# Patient Record
Sex: MALE | Race: WHITE | NOT HISPANIC OR LATINO | Employment: OTHER | URBAN - METROPOLITAN AREA
[De-identification: names, ages, dates, MRNs, and addresses within clinical notes are randomized per-mention and may not be internally consistent; named-entity substitution may affect disease eponyms.]

---

## 2022-03-15 ENCOUNTER — TRANSFERRED RECORDS (OUTPATIENT)
Dept: HEALTH INFORMATION MANAGEMENT | Facility: CLINIC | Age: 82
End: 2022-03-15

## 2022-03-15 LAB
CHOLESTEROL (EXTERNAL): 98 MG/DL (ref 0–200)
HDLC SERPL-MCNC: 55 MG/DL (ref 35–65)
LDL CHOLESTEROL CALCULATED (EXTERNAL): 28 MG/DL (ref 0–130)
TRIGLYCERIDES (EXTERNAL): 72 MG/DL (ref 0–200)

## 2023-03-15 ENCOUNTER — TRANSFERRED RECORDS (OUTPATIENT)
Dept: HEALTH INFORMATION MANAGEMENT | Facility: CLINIC | Age: 83
End: 2023-03-15

## 2023-11-13 ENCOUNTER — MEDICAL CORRESPONDENCE (OUTPATIENT)
Dept: HEALTH INFORMATION MANAGEMENT | Facility: CLINIC | Age: 83
End: 2023-11-13
Payer: COMMERCIAL

## 2023-11-13 ENCOUNTER — TRANSFERRED RECORDS (OUTPATIENT)
Dept: HEALTH INFORMATION MANAGEMENT | Facility: CLINIC | Age: 83
End: 2023-11-13
Payer: COMMERCIAL

## 2023-11-20 ENCOUNTER — OFFICE VISIT (OUTPATIENT)
Dept: CARDIOLOGY | Facility: CLINIC | Age: 83
End: 2023-11-20
Payer: COMMERCIAL

## 2023-11-20 VITALS
SYSTOLIC BLOOD PRESSURE: 134 MMHG | DIASTOLIC BLOOD PRESSURE: 60 MMHG | WEIGHT: 199 LBS | HEART RATE: 60 BPM | HEIGHT: 68 IN | RESPIRATION RATE: 16 BRPM | BODY MASS INDEX: 30.16 KG/M2

## 2023-11-20 DIAGNOSIS — I77.89 ASCENDING AORTA ENLARGEMENT (H): ICD-10-CM

## 2023-11-20 DIAGNOSIS — E78.5 HYPERLIPIDEMIA LDL GOAL <70: ICD-10-CM

## 2023-11-20 DIAGNOSIS — I25.10 CORONARY ARTERY DISEASE INVOLVING NATIVE CORONARY ARTERY OF NATIVE HEART WITHOUT ANGINA PECTORIS: Primary | ICD-10-CM

## 2023-11-20 DIAGNOSIS — I10 BENIGN ESSENTIAL HYPERTENSION: ICD-10-CM

## 2023-11-20 PROCEDURE — 99204 OFFICE O/P NEW MOD 45 MIN: CPT | Performed by: INTERNAL MEDICINE

## 2023-11-20 RX ORDER — LOSARTAN POTASSIUM 100 MG/1
100 TABLET ORAL DAILY
COMMUNITY
Start: 2023-03-13 | End: 2024-06-04

## 2023-11-20 RX ORDER — ALBUTEROL SULFATE 90 UG/1
1-2 AEROSOL, METERED RESPIRATORY (INHALATION) PRN
COMMUNITY

## 2023-11-20 RX ORDER — ROSUVASTATIN CALCIUM 10 MG/1
10 TABLET, COATED ORAL DAILY
COMMUNITY
Start: 2023-03-13 | End: 2024-06-04

## 2023-11-20 RX ORDER — AMLODIPINE BESYLATE 5 MG/1
5 TABLET ORAL DAILY
COMMUNITY
Start: 2023-03-13 | End: 2024-06-04

## 2023-11-20 RX ORDER — SILDENAFIL 100 MG/1
100 TABLET, FILM COATED ORAL DAILY PRN
COMMUNITY
Start: 2023-09-27

## 2023-11-20 RX ORDER — PHENYLEPHRINE HCL 10 MG
1 TABLET ORAL 2 TIMES DAILY
COMMUNITY

## 2023-11-20 RX ORDER — TADALAFIL 5 MG/1
5 TABLET ORAL DAILY PRN
COMMUNITY
Start: 2023-09-11

## 2023-11-20 RX ORDER — ACETAMINOPHEN 500 MG
1000 TABLET ORAL PRN
COMMUNITY
Start: 2022-08-18

## 2023-11-20 RX ORDER — SODIUM PHOSPHATE,MONO-DIBASIC 19G-7G/118
1 ENEMA (ML) RECTAL 2 TIMES DAILY
COMMUNITY

## 2023-11-20 RX ORDER — METOPROLOL SUCCINATE 50 MG/1
50 TABLET, EXTENDED RELEASE ORAL DAILY
COMMUNITY
Start: 2023-03-13 | End: 2024-06-04

## 2023-11-20 RX ORDER — LATANOPROST 50 UG/ML
1 SOLUTION/ DROPS OPHTHALMIC DAILY
COMMUNITY

## 2023-11-20 RX ORDER — FLUTICASONE PROPIONATE AND SALMETEROL 250; 50 UG/1; UG/1
1 POWDER RESPIRATORY (INHALATION) DAILY
COMMUNITY

## 2023-11-20 RX ORDER — LACTOBACILLUS RHAMNOSUS GG 10B CELL
1 CAPSULE ORAL 2 TIMES DAILY
COMMUNITY

## 2023-11-20 RX ORDER — COVID-19 ANTIGEN TEST
220 KIT MISCELLANEOUS 2 TIMES DAILY
COMMUNITY

## 2023-11-20 RX ORDER — GABAPENTIN 600 MG/1
900 TABLET ORAL 2 TIMES DAILY
COMMUNITY
Start: 2023-01-17

## 2023-11-20 RX ORDER — ASPIRIN 81 MG/1
81 TABLET, CHEWABLE ORAL DAILY
COMMUNITY

## 2023-11-20 NOTE — PROGRESS NOTES
[facility]  [phone]          Assessment/Recommendations   With known coronary artery disease, status post bare-metal stent in the year 2000.  He is also been treated for hypertension and hyperlipidemia both successfully.  Recent LDL was 68 and blood pressure is at goal.  He also takes an antiplatelet agent.    He exercises 5-6 times a week at the [facility] doing 30 to 45 minutes on either the treadmill or the elliptical without any anginal symptoms.    He did have an enlarged ascending aorta 2 years ago and we will repeat an echocardiogram.  If his aorta looks bigger than 4.0 cm, I would have a low threshold for CT to look at his ascending aortic in its entirety.  We could do that here in [place].    If above testing is unremarkable, we will see him back in 1 year, but of course would be happy to see him sooner if questions or problems arise.    Thank you for allowing us to participate in his care.       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  Patient has been feeling well.  He is a  x1 year and initially did not do much in the way of exercise after his spouse passed.  He has been picking that up of late and works out at the [facility] 5-6 times a week, going on the treadmill and or elliptical for 30 to 45 minutes.  He has no chest discomfort and also denies unusual shortness of breath, orthopnea, paroxysmal nocturnal dyspnea, peripheral edema, syncope or near syncopal episodes.  He has no history rheumatic fever, heart murmur as a child but not as an adult.  No history of cerebrovascular accident.    He is treated for hypertension and his father had angina in his 50s and bypass surgery in his 80s.  Patient does have hypertension and hyperlipidemia but is not diabetic and quit smoking 30 years ago.    Patient grew up in [facility] [place] [facility].  He is a retired dermatology .  " He has 2 daughters and grandchildren which is why he moved up into the [facility] area.    Echocardiogram 2 years ago suggested enlargement of the ascending aorta at 4.0 cm.  Nuclear stress test was unremarkable a couple of years ago.         Physical Examination Review of Systems   /60 (BP Location: Left arm, Patient Position: Sitting, Cuff Size: Adult Regular)   Pulse 60   Resp 16   Ht 1.727 m (5' 8\")   Wt 90.3 kg (199 lb)   BMI 30.26 kg/m    Body mass index is 30.26 kg/m .  Wt Readings from Last 3 Encounters:   11/20/23 90.3 kg (199 lb)   02/07/14 93.7 kg (206 lb 8 oz)     General Appearance:   Alert, cooperative and in no acute distress.   ENT/Mouth: Pink/moist oral mucosa   EYES:  no scleral icterus, normal conjunctivae   Neck: JVP normal. No Hepatojugular reflux. Thyroid not visualized.   Chest/Lungs:   Lungs are clear to auscultation, equal chest wall expansion.   Cardiovascular:   S1, S2 without murmur ,clicks or rubs. Brachial, radial and posterior tibial pulses are intact and symetric.  Right posterior tibial pulses are slightly diminished when compared to the left.  No carotid bruits noted   Abdomen:  Nontender. BS+.   Extremities: No cyanosis, clubbing or edema   Skin: no xanthelasma, warm.    Neurologic: normal arm movement bilateral, no tremors     Psychiatric: Appropriate affect.      Enc Vitals  BP: 134/60  Pulse: 60  Resp: 16  Weight: 90.3 kg (199 lb)  Height: 172.7 cm (5' 8\")                                           Medical History  Surgical History Family History Social History   No past medical history on file. No past surgical history on file. No family history on file. Social History     Socioeconomic History    Marital status:      Spouse name: Not on file    Number of children: Not on file    Years of education: Not on file    Highest education level: Not on file   Occupational History    Not on file   Tobacco Use    Smoking status: Former     Types: " Cigarettes     Quit date:      Years since quittin.9    Smokeless tobacco: Never   Vaping Use    Vaping Use: Never used   Substance and Sexual Activity    Alcohol use: Not on file    Drug use: Never    Sexual activity: Not on file   Other Topics Concern    Not on file   Social History Narrative    Not on file     Social Determinants of Health     Financial Resource Strain: Not on file   Food Insecurity: Not on file   Transportation Needs: Not on file   Physical Activity: Not on file   Stress: Not on file   Social Connections: Not on file   Interpersonal Safety: Not on file   Housing Stability: Not on file          Medications  Allergies   Current Outpatient Medications   Medication Sig Dispense Refill    acetaminophen (TYLENOL) 500 MG tablet Take 1,000 mg by mouth as needed      albuterol (PROAIR HFA/PROVENTIL HFA/VENTOLIN HFA) 108 (90 Base) MCG/ACT inhaler Inhale 1-2 puffs into the lungs as needed      amLODIPine (NORVASC) 5 MG tablet Take 5 mg by mouth daily      ascorbic acid 1000 MG TABS tablet Take 1 tablet by mouth daily      aspirin (ASA) 81 MG chewable tablet Take 81 mg by mouth daily      Ferrous Sulfate (IRON PO) Take 1 tablet by mouth daily      fluticasone-salmeterol (ADVAIR) 250-50 MCG/ACT inhaler Inhale 1 puff into the lungs daily      gabapentin (NEURONTIN) 600 MG tablet Take 900 mg by mouth 2 times daily      glucosamine-chondroitin 500-400 MG CAPS per capsule Take 1 capsule by mouth 2 times daily      lactobacillus rhamnosus, GG, (CULTURELL) capsule Take 1 capsule by mouth 2 times daily      latanoprost (XALATAN) 0.005 % ophthalmic solution Place 1 drop into both eyes daily      losartan (COZAAR) 100 MG tablet Take 100 mg by mouth daily      metoprolol succinate ER (TOPROL XL) 50 MG 24 hr tablet Take 50 mg by mouth daily      naproxen sodium 220 MG capsule Take 220 mg by mouth 2 times daily      Omega-3 Fatty Acids (FISH OIL DOUBLE STRENGTH) 1200 MG CAPS Take 1 capsule by mouth 2 times  "daily      omeprazole (PRILOSEC) 20 MG DR capsule Take 20 mg by mouth daily      rosuvastatin (CRESTOR) 10 MG tablet Take 10 mg by mouth daily      sildenafil (VIAGRA) 100 MG tablet Take 100 mg by mouth daily as needed      tadalafil (CIALIS) 5 MG tablet Take 5 mg by mouth daily as needed      Allergies   Allergen Reactions    Azithromycin Hives    Rabies Vaccine (Hamster) Hives     Says human rabies immune globulin-no reactions noted    Sulfa Antibiotics Hives    Atorvastatin Muscle Pain (Myalgia)    Rabies Immune Globulin (Human) Angioedema, Hives and Other (See Comments)    Silicone Rash         Lab Results    Chemistry/lipid CBC Cardiac Enzymes/BNP/TSH/INR   No results found for: \"CHOL\", \"HDL\", \"TRIG\", \"CHOLHDL\", \"CREATININE\", \"BUN\", \"NA\", \"CO2\" No results found for: \"WBC\", \"HGB\", \"HCT\", \"MCV\", \"PLT\" No results found for: \"CKTOTAL\", \"CKMB\", \"TROPONINI\", \"BNP\", \"TSH\", \"INR\"                                         "

## 2023-11-20 NOTE — LETTER
11/20/2023    Sergei Zarate MD  [facility] [facility] [address]    RE: Avel Hernandez       Dear Colleague,     I had the pleasure of seeing Avel Hernandez in the [facility].      [facility]  [phone]          Assessment/Recommendations   With known coronary artery disease, status post bare-metal stent in the year 2000.  He is also been treated for hypertension and hyperlipidemia both successfully.  Recent LDL was 68 and blood pressure is at goal.  He also takes an antiplatelet agent.    He exercises 5-6 times a week at the [facility] doing 30 to 45 minutes on either the treadmill or the elliptical without any anginal symptoms.    He did have an enlarged ascending aorta 2 years ago and we will repeat an echocardiogram.  If his aorta looks bigger than 4.0 cm, I would have a low threshold for CT to look at his ascending aortic in its entirety.  We could do that here in [place].    If above testing is unremarkable, we will see him back in 1 year, but of course would be happy to see him sooner if questions or problems arise.    Thank you for allowing us to participate in his care.       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  Patient has been feeling well.  He is a  x1 year and initially did not do much in the way of exercise after his spouse passed.  He has been picking that up of late and works out at the [facility] 5-6 times a week, going on the treadmill and or elliptical for 30 to 45 minutes.  He has no chest discomfort and also denies unusual shortness of breath, orthopnea, paroxysmal nocturnal dyspnea, peripheral edema, syncope or near syncopal episodes.  He has no history rheumatic fever, heart murmur as a child but not as an adult.  No history of cerebrovascular accident.    He is treated for hypertension and his father  "had angina in his 50s and bypass surgery in his 80s.  Patient does have hypertension and hyperlipidemia but is not diabetic and quit smoking 30 years ago.    Patient grew up in [place].  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    Echocardiogram 2 years ago suggested enlargement of the ascending aorta at 4.0 cm.  Nuclear stress test was unremarkable a couple of years ago.         Physical Examination Review of Systems   /60 (BP Location: Left arm, Patient Position: Sitting, Cuff Size: Adult Regular)   Pulse 60   Resp 16   Ht 1.727 m (5' 8\")   Wt 90.3 kg (199 lb)   BMI 30.26 kg/m    Body mass index is 30.26 kg/m .  Wt Readings from Last 3 Encounters:   11/20/23 90.3 kg (199 lb)   02/07/14 93.7 kg (206 lb 8 oz)     General Appearance:   Alert, cooperative and in no acute distress.   ENT/Mouth: Pink/moist oral mucosa   EYES:  no scleral icterus, normal conjunctivae   Neck: JVP normal. No Hepatojugular reflux. Thyroid not visualized.   Chest/Lungs:   Lungs are clear to auscultation, equal chest wall expansion.   Cardiovascular:   S1, S2 without murmur ,clicks or rubs. Brachial, radial and posterior tibial pulses are intact and symetric.  Right posterior tibial pulses are slightly diminished when compared to the left.  No carotid bruits noted   Abdomen:  Nontender. BS+.   Extremities: No cyanosis, clubbing or edema   Skin: no xanthelasma, warm.    Neurologic: normal arm movement bilateral, no tremors     Psychiatric: Appropriate affect.      Enc Vitals  BP: 134/60  Pulse: 60  Resp: 16  Weight: 90.3 kg (199 lb)  Height: 172.7 cm (5' 8\")                                           Medical History  Surgical History Family History Social History   No past medical history on file. No past surgical history on file. No family history on file. Social History     Socioeconomic History    Marital status:  "     Spouse name: Not on file    Number of children: Not on file    Years of education: Not on file    Highest education level: Not on file   Occupational History    Not on file   Tobacco Use    Smoking status: Former     Types: Cigarettes     Quit date:      Years since quittin.9    Smokeless tobacco: Never   Vaping Use    Vaping Use: Never used   Substance and Sexual Activity    Alcohol use: Not on file    Drug use: Never    Sexual activity: Not on file   Other Topics Concern    Not on file   Social History Narrative    Not on file     Social Determinants of Health     Financial Resource Strain: Not on file   Food Insecurity: Not on file   Transportation Needs: Not on file   Physical Activity: Not on file   Stress: Not on file   Social Connections: Not on file   Interpersonal Safety: Not on file   Housing Stability: Not on file          Medications  Allergies   Current Outpatient Medications   Medication Sig Dispense Refill    acetaminophen (TYLENOL) 500 MG tablet Take 1,000 mg by mouth as needed      albuterol (PROAIR HFA/PROVENTIL HFA/VENTOLIN HFA) 108 (90 Base) MCG/ACT inhaler Inhale 1-2 puffs into the lungs as needed      amLODIPine (NORVASC) 5 MG tablet Take 5 mg by mouth daily      ascorbic acid 1000 MG TABS tablet Take 1 tablet by mouth daily      aspirin (ASA) 81 MG chewable tablet Take 81 mg by mouth daily      Ferrous Sulfate (IRON PO) Take 1 tablet by mouth daily      fluticasone-salmeterol (ADVAIR) 250-50 MCG/ACT inhaler Inhale 1 puff into the lungs daily      gabapentin (NEURONTIN) 600 MG tablet Take 900 mg by mouth 2 times daily      glucosamine-chondroitin 500-400 MG CAPS per capsule Take 1 capsule by mouth 2 times daily      lactobacillus rhamnosus, GG, (CULTURELL) capsule Take 1 capsule by mouth 2 times daily      latanoprost (XALATAN) 0.005 % ophthalmic solution Place 1 drop into both eyes daily      losartan (COZAAR) 100 MG tablet Take 100 mg by mouth daily      metoprolol succinate ER  "(TOPROL XL) 50 MG 24 hr tablet Take 50 mg by mouth daily      naproxen sodium 220 MG capsule Take 220 mg by mouth 2 times daily      Omega-3 Fatty Acids (FISH OIL DOUBLE STRENGTH) 1200 MG CAPS Take 1 capsule by mouth 2 times daily      omeprazole (PRILOSEC) 20 MG DR capsule Take 20 mg by mouth daily      rosuvastatin (CRESTOR) 10 MG tablet Take 10 mg by mouth daily      sildenafil (VIAGRA) 100 MG tablet Take 100 mg by mouth daily as needed      tadalafil (CIALIS) 5 MG tablet Take 5 mg by mouth daily as needed      Allergies   Allergen Reactions    Azithromycin Hives    Rabies Vaccine (Hamster) Hives     Says human rabies immune globulin-no reactions noted    Sulfa Antibiotics Hives    Atorvastatin Muscle Pain (Myalgia)    Rabies Immune Globulin (Human) Angioedema, Hives and Other (See Comments)    Silicone Rash         Lab Results    Chemistry/lipid CBC Cardiac Enzymes/BNP/TSH/INR   No results found for: \"CHOL\", \"HDL\", \"TRIG\", \"CHOLHDL\", \"CREATININE\", \"BUN\", \"NA\", \"CO2\" No results found for: \"WBC\", \"HGB\", \"HCT\", \"MCV\", \"PLT\" No results found for: \"CKTOTAL\", \"CKMB\", \"TROPONINI\", \"BNP\", \"TSH\", \"INR\"               Thank you for allowing me to participate in the care of your patient.      Sincerely,     Olivier Russell MD     [facility]  cc:   No referring provider defined for this encounter.      "

## 2023-12-19 ENCOUNTER — ANCILLARY PROCEDURE (OUTPATIENT)
Dept: CARDIOLOGY | Facility: CLINIC | Age: 83
End: 2023-12-19
Attending: INTERNAL MEDICINE
Payer: COMMERCIAL

## 2023-12-19 DIAGNOSIS — I77.89 ASCENDING AORTA ENLARGEMENT (H): ICD-10-CM

## 2023-12-19 DIAGNOSIS — E78.5 HYPERLIPIDEMIA LDL GOAL <70: ICD-10-CM

## 2023-12-19 DIAGNOSIS — I10 BENIGN ESSENTIAL HYPERTENSION: ICD-10-CM

## 2023-12-19 DIAGNOSIS — I25.10 CORONARY ARTERY DISEASE INVOLVING NATIVE CORONARY ARTERY OF NATIVE HEART WITHOUT ANGINA PECTORIS: ICD-10-CM

## 2023-12-19 LAB — LVEF ECHO: NORMAL

## 2023-12-19 PROCEDURE — 93306 TTE W/DOPPLER COMPLETE: CPT | Performed by: INTERNAL MEDICINE

## 2023-12-22 DIAGNOSIS — R94.31 ABNORMAL ELECTROCARDIOGRAM (ECG) (EKG): ICD-10-CM

## 2023-12-22 DIAGNOSIS — I77.89 ASCENDING AORTA ENLARGEMENT (H): Primary | ICD-10-CM

## 2024-05-31 DIAGNOSIS — E78.5 HYPERLIPIDEMIA LDL GOAL <70: ICD-10-CM

## 2024-05-31 DIAGNOSIS — I10 BENIGN ESSENTIAL HYPERTENSION: ICD-10-CM

## 2024-05-31 DIAGNOSIS — I25.10 CORONARY ARTERY DISEASE INVOLVING NATIVE CORONARY ARTERY OF NATIVE HEART WITHOUT ANGINA PECTORIS: Primary | ICD-10-CM

## 2024-05-31 NOTE — TELEPHONE ENCOUNTER
Bmp received and notified Dr. Russell. Refills submitted-nichole    Spoke with Pt regarding vmm recvd. Pt was to have repeat bmp at pcp 03/2024. He confirms it was completed. Advised it had not been received. Writer will request from medical records, review with Dr. Russell and submit refills as requested. He states he has 1 week left-nichole    Name of medication being requested:   rosuvastatin (CRESTOR) 10 MG tablet  amLODIPine (NORVASC) 5 MG tablet  losartan (COZAAR) 100 MG tablet  metoprolol succinate ER (TOPROL XL) 50 MG 24 hr tablet      From: Olivier Russell MD  Sent: 6/6/2024   7:34 AM CDT  To: Kary Preston,    Basic metabolic panel received but it took quite some time and I think you had asked for it.  No issues with the blood work.  Follow-up as planned.    Thanks,    [place]ier

## 2024-06-04 RX ORDER — ROSUVASTATIN CALCIUM 10 MG/1
10 TABLET, COATED ORAL DAILY
Qty: 90 TABLET | Refills: 1 | Status: SHIPPED | OUTPATIENT
Start: 2024-06-04

## 2024-06-04 RX ORDER — METOPROLOL SUCCINATE 50 MG/1
50 TABLET, EXTENDED RELEASE ORAL DAILY
Qty: 90 TABLET | Refills: 1 | Status: SHIPPED | OUTPATIENT
Start: 2024-06-04

## 2024-06-04 RX ORDER — AMLODIPINE BESYLATE 5 MG/1
5 TABLET ORAL DAILY
Qty: 90 TABLET | Refills: 1 | Status: SHIPPED | OUTPATIENT
Start: 2024-06-04

## 2024-06-04 RX ORDER — LOSARTAN POTASSIUM 100 MG/1
100 TABLET ORAL DAILY
Qty: 90 TABLET | Refills: 1 | Status: SHIPPED | OUTPATIENT
Start: 2024-06-04

## 2025-03-05 ENCOUNTER — TELEPHONE (OUTPATIENT)
Dept: CARDIOLOGY | Facility: CLINIC | Age: 85
End: 2025-03-05
Payer: COMMERCIAL

## 2025-03-05 DIAGNOSIS — I25.10 CORONARY ARTERY DISEASE INVOLVING NATIVE CORONARY ARTERY OF NATIVE HEART WITHOUT ANGINA PECTORIS: Primary | ICD-10-CM

## 2025-03-05 DIAGNOSIS — E78.5 HYPERLIPIDEMIA LDL GOAL <70: ICD-10-CM

## 2025-03-05 DIAGNOSIS — I77.89 ASCENDING AORTA ENLARGEMENT: ICD-10-CM

## 2025-03-05 DIAGNOSIS — I10 BENIGN ESSENTIAL HYPERTENSION: ICD-10-CM

## 2025-03-05 RX ORDER — ROSUVASTATIN CALCIUM 10 MG/1
10 TABLET, COATED ORAL DAILY
Qty: 90 TABLET | Refills: 0 | Status: SHIPPED | OUTPATIENT
Start: 2025-03-05

## 2025-03-05 RX ORDER — AMLODIPINE BESYLATE 5 MG/1
5 TABLET ORAL DAILY
Qty: 90 TABLET | Refills: 0 | Status: SHIPPED | OUTPATIENT
Start: 2025-03-05

## 2025-03-05 RX ORDER — LOSARTAN POTASSIUM 100 MG/1
100 TABLET ORAL DAILY
Qty: 90 TABLET | Refills: 0 | Status: SHIPPED | OUTPATIENT
Start: 2025-03-05

## 2025-03-05 RX ORDER — METOPROLOL SUCCINATE 50 MG/1
50 TABLET, EXTENDED RELEASE ORAL DAILY
Qty: 90 TABLET | Refills: 0 | Status: SHIPPED | OUTPATIENT
Start: 2025-03-05

## 2025-03-05 NOTE — TELEPHONE ENCOUNTER
Spoke with Pt returning vmm, refills provided. Transferred to scheduling in need of echo and follow up thereafter-nichole

## 2025-03-19 ENCOUNTER — ANCILLARY PROCEDURE (OUTPATIENT)
Dept: CARDIOLOGY | Facility: CLINIC | Age: 85
End: 2025-03-19
Attending: INTERNAL MEDICINE
Payer: COMMERCIAL

## 2025-03-19 DIAGNOSIS — I77.89 ASCENDING AORTA ENLARGEMENT: ICD-10-CM

## 2025-03-19 DIAGNOSIS — I10 BENIGN ESSENTIAL HYPERTENSION: ICD-10-CM

## 2025-03-19 LAB — LVEF ECHO: NORMAL

## 2025-03-19 PROCEDURE — 93306 TTE W/DOPPLER COMPLETE: CPT | Performed by: INTERNAL MEDICINE

## 2025-04-23 ENCOUNTER — OFFICE VISIT (OUTPATIENT)
Dept: CARDIOLOGY | Facility: CLINIC | Age: 85
End: 2025-04-23
Payer: COMMERCIAL

## 2025-04-23 VITALS
SYSTOLIC BLOOD PRESSURE: 124 MMHG | BODY MASS INDEX: 29.86 KG/M2 | HEIGHT: 68 IN | DIASTOLIC BLOOD PRESSURE: 71 MMHG | RESPIRATION RATE: 16 BRPM | WEIGHT: 197 LBS | HEART RATE: 58 BPM

## 2025-04-23 DIAGNOSIS — I25.10 CORONARY ARTERY DISEASE INVOLVING NATIVE CORONARY ARTERY OF NATIVE HEART WITHOUT ANGINA PECTORIS: ICD-10-CM

## 2025-04-23 DIAGNOSIS — I10 BENIGN ESSENTIAL HYPERTENSION: ICD-10-CM

## 2025-04-23 DIAGNOSIS — E78.5 HYPERLIPIDEMIA LDL GOAL <70: ICD-10-CM

## 2025-04-23 DIAGNOSIS — I77.89 ASCENDING AORTA ENLARGEMENT: Primary | ICD-10-CM

## 2025-04-23 PROCEDURE — G2211 COMPLEX E/M VISIT ADD ON: HCPCS | Performed by: INTERNAL MEDICINE

## 2025-04-23 PROCEDURE — 99214 OFFICE O/P EST MOD 30 MIN: CPT | Performed by: INTERNAL MEDICINE

## 2025-04-23 PROCEDURE — 3074F SYST BP LT 130 MM HG: CPT | Performed by: INTERNAL MEDICINE

## 2025-04-23 PROCEDURE — 3078F DIAST BP <80 MM HG: CPT | Performed by: INTERNAL MEDICINE

## 2025-04-23 NOTE — LETTER
2025    [facility] [place] [facility] [address] [place] [id number]    RE: Avel Arroyo       Dear Colleague,     I had the pleasure of seeing Avel Arroyo in the [facility].      [facility]  [phone]          Assessment/Recommendations   Patient with known coronary artery disease status post bare-metal stent placed in the year .  He has had a good year with no chest discomfort unusual shortness of breath with activity.  He has not had a recent lipid panel so we will send him to the [facility] lab for lipid panel in the next few days when he is in the fasting state.    Blood pressure is at goal, he exercises on a regular basis and he takes an antiplatelet agent.    His ascending aorta is the same size as last year on echocardiogram and we will repeat an echocardiogram in 1 year and have him seen in follow-up at that time.    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 Due to the added complexity in care, I will continue to support Avel in the subsequent management and with ongoing continuity of care.        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  He has been feeling well this past year.  He has had some difficulty with hip issues and he started to swim in the last month.  He feels like his breathing is stable.  He has not had any chest discomfort.  He denies orthopnea, paroxysmal nocturnal dyspnea has some minimal peripheral edema.  No syncopal or near syncopal episodes.  Echocardiogram reviewed.       Name: AVEL ARROYO  MRN: [id number]  : 1940  Study Date: 2025 09:03 AM  Age: 84 yrs  Gender: Male  Patient Location: HDNCVT  Reason For Study: Ascending aorta enlargement, Benign essential  "hypertension  Ordering Physician: JOSE DAVID SHAHID  Referring Physician: JOSE DAVID SHAHID  Performed By: AD     BSA: 2.0 m2  Height: 68 in  Weight: 199 lb  HR: 45  BP: 119/72 mmHg  ______________________________________________________________________________  Procedure  Echocardiogram with two-dimensional, color and spectral Doppler.  ______________________________________________________________________________  Interpretation Summary     Left ventricular size, wall motion and function are normal. The ejection  fraction is 55-60%.  There is mild concentric left ventricular hypertrophy.  No regional wall motion abnormalities noted.  Normal right ventricle size and systolic function.  No hemodyanymically significant valvular abnormalities.  Ascending Aorta dilatation is present 4.2 cm.     When compared to previous study on 12-, there is no significant  interval change.       Physical Examination Review of Systems   /71 (BP Location: Left arm, Patient Position: Sitting, Cuff Size: Adult Regular)   Pulse 58   Resp 16   Ht 1.715 m (5' 7.5\")   Wt 89.4 kg (197 lb)   BMI 30.40 kg/m    Body mass index is 30.4 kg/m .  Wt Readings from Last 3 Encounters:   04/23/25 89.4 kg (197 lb)   11/20/23 90.3 kg (199 lb)   02/07/14 93.7 kg (206 lb 8 oz)     General Appearance:   Alert, cooperative and in no acute distress.   ENT/Mouth: Pink/moist oral mucosa   EYES:  no scleral icterus, normal conjunctivae   Neck: JVP normal. No Hepatojugular reflux. Thyroid not visualized.   Chest/Lungs:   Lungs are clear to auscultation, equal chest wall expansion.   Cardiovascular:   S1, S2 without murmur ,clicks or rubs. Brachial, radial pulses are intact and symetric. No carotid bruits noted   Abdomen:  Nontender.    Extremities: No cyanosis, clubbing trace pretibial edema   Skin: no xanthelasma, warm.    Neurologic: normal arm movement bilateral, no tremors     Psychiatric: Appropriate affect.      Encounter Vitals  BP: " "124/71  Pulse: 58  Resp: 16  Weight: 89.4 kg (197 lb) (With shoes.)  Height: 171.5 cm (5' 7.5\")                                           Medical History  Surgical History Family History Social History   No past medical history on file. No past surgical history on file. No family history on file. Social History     Socioeconomic History     Marital status:      Spouse name: Not on file     Number of children: Not on file     Years of education: Not on file     Highest education level: Not on file   Occupational History     Not on file   Tobacco Use     Smoking status: Former     Current packs/day: 0.00     Types: Cigarettes     Quit date:      Years since quittin.3     Smokeless tobacco: Never   Vaping Use     Vaping status: Never Used   Substance and Sexual Activity     Alcohol use: Not on file     Drug use: Never     Sexual activity: Not on file   Other Topics Concern     Not on file   Social History Narrative     Not on file     Social Drivers of Health     Financial Resource Strain: Not on file   Food Insecurity: No Food Insecurity (10/31/2024)    Received from [facility]    Hunger Vital Sign      Worried About Running Out of Food in the Last Year: Never true      Ran Out of Food in the Last Year: Never true   Transportation Needs: No Transportation Needs (10/31/2024)    Received from [facility]    PRAPARE - Transportation      Lack of Transportation (Medical): No      Lack of Transportation (Non-Medical): No   Physical Activity: Sufficiently Active (10/31/2024)    Received from [facility]    Exercise Vital Sign      Days of Exercise per Week: 3 days      Minutes of Exercise per Session: 60 min   Stress: Not on file   Social Connections: Not on file   Interpersonal Safety: Not on file   Housing Stability: Low Risk  (10/31/2024)    Received from [facility]    Housing Stability      What is your living situation today?: I have a steady place to live          Medications  Allergies   Current " Outpatient Medications   Medication Sig Dispense Refill     acetaminophen (TYLENOL) 500 MG tablet Take 1,000 mg by mouth as needed       albuterol (PROAIR HFA/PROVENTIL HFA/VENTOLIN HFA) 108 (90 Base) MCG/ACT inhaler Inhale 1-2 puffs into the lungs as needed       amLODIPine (NORVASC) 5 MG tablet Take 1 tablet (5 mg) by mouth daily. 90 tablet 0     ascorbic acid 1000 MG TABS tablet Take 1 tablet by mouth daily       fluticasone-salmeterol (ADVAIR) 250-50 MCG/ACT inhaler Inhale 1 puff into the lungs daily       gabapentin (NEURONTIN) 600 MG tablet Take 900 mg by mouth 2 times daily       glucosamine-chondroitin 500-400 MG CAPS per capsule Take 1 capsule by mouth 2 times daily       lactobacillus rhamnosus, GG, (CULTURELL) capsule Take 1 capsule by mouth 2 times daily       latanoprost (XALATAN) 0.005 % ophthalmic solution Place 1 drop into both eyes daily       losartan (COZAAR) 100 MG tablet Take 1 tablet (100 mg) by mouth daily. 90 tablet 0     metoprolol succinate ER (TOPROL XL) 50 MG 24 hr tablet Take 1 tablet (50 mg) by mouth daily. 90 tablet 0     naproxen sodium 220 MG capsule Take 220 mg by mouth as needed.       Omega-3 Fatty Acids (FISH OIL DOUBLE STRENGTH) 1200 MG CAPS Take 1 capsule by mouth 2 times daily       omeprazole (PRILOSEC) 20 MG DR capsule Take 20 mg by mouth daily       rosuvastatin (CRESTOR) 10 MG tablet Take 1 tablet (10 mg) by mouth daily. 90 tablet 0     sildenafil (VIAGRA) 100 MG tablet Take 100 mg by mouth daily as needed       tadalafil (CIALIS) 5 MG tablet Take 5 mg by mouth daily as needed      Allergies   Allergen Reactions     Azithromycin Hives     Rabies Vaccine (Hamster) Hives     Says human rabies immune globulin-no reactions noted     Sulfa Antibiotics Hives     Atorvastatin Muscle Pain (Myalgia)     Rabies Immune Globulin (Human) Angioedema, Hives and Other (See Comments)     Silicone Rash         Lab Results    Chemistry/lipid CBC Cardiac Enzymes/BNP/TSH/INR   Lab Results  "  Component Value Date    TRIG 72 03/15/2022    No results found for: \"WBC\", \"HGB\", \"HCT\", \"MCV\", \"PLT\" No results found for: \"CKTOTAL\", \"CKMB\", \"TROPONINI\", \"BNP\", \"TSH\", \"INR\"                                             Thank you for allowing me to participate in the care of your patient.      Sincerely,     Olivier Russell MD     [facility]  cc:   Olivier Russell MD  [address] [facility] [address]  [place] [address]      "

## 2025-04-23 NOTE — PROGRESS NOTES
[facility]  [phone]          Assessment/Recommendations   Patient with known coronary artery disease status post bare-metal stent placed in the year .  He has had a good year with no chest discomfort unusual shortness of breath with activity.  He has not had a recent lipid panel so we will send him to the [place] lab for lipid panel in the next few days when he is in the fasting state.    Blood pressure is at goal, he exercises on a regular basis and he takes an antiplatelet agent.    His ascending aorta is the same size as last year on echocardiogram and we will repeat an echocardiogram in 1 year and have him seen in follow-up at that time.    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 Due to the added complexity in care, I will continue to support Avel in the subsequent management and with ongoing continuity of care.        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  He has been feeling well this past year.  He has had some difficulty with hip issues and he started to swim in the last month.  He feels like his breathing is stable.  He has not had any chest discomfort.  He denies orthopnea, paroxysmal nocturnal dyspnea has some minimal peripheral edema.  No syncopal or near syncopal episodes.  Echocardiogram reviewed.       Name: AVEL ARROYO  MRN: [id number]  : 1940  Study Date: 2025 09:03 AM  Age: 84 yrs  Gender: Male  Patient Location: [facility]  Reason For Study: Ascending aorta enlargement, Benign essential hypertension  Ordering Physician: JOSE DAVID SHAHID  Referring Physician: JOSE DAVID SHAHID  Performed By: ANASTASIA     BSA: 2.0 m2  Height: 68 in  Weight: 199 lb  HR: 45  BP: 119/72  "mmHg  ______________________________________________________________________________  Procedure  Echocardiogram with two-dimensional, color and spectral Doppler.  ______________________________________________________________________________  Interpretation Summary     Left ventricular size, wall motion and function are normal. The ejection  fraction is 55-60%.  There is mild concentric left ventricular hypertrophy.  No regional wall motion abnormalities noted.  Normal right ventricle size and systolic function.  No hemodyanymically significant valvular abnormalities.  Ascending Aorta dilatation is present 4.2 cm.     When compared to previous study on 12-, there is no significant  interval change.       Physical Examination Review of Systems   /71 (BP Location: Left arm, Patient Position: Sitting, Cuff Size: Adult Regular)   Pulse 58   Resp 16   Ht 1.715 m (5' 7.5\")   Wt 89.4 kg (197 lb)   BMI 30.40 kg/m    Body mass index is 30.4 kg/m .  Wt Readings from Last 3 Encounters:   04/23/25 89.4 kg (197 lb)   11/20/23 90.3 kg (199 lb)   02/07/14 93.7 kg (206 lb 8 oz)     General Appearance:   Alert, cooperative and in no acute distress.   ENT/Mouth: Pink/moist oral mucosa   EYES:  no scleral icterus, normal conjunctivae   Neck: JVP normal. No Hepatojugular reflux. Thyroid not visualized.   Chest/Lungs:   Lungs are clear to auscultation, equal chest wall expansion.   Cardiovascular:   S1, S2 without murmur ,clicks or rubs. Brachial, radial pulses are intact and symetric. No carotid bruits noted   Abdomen:  Nontender.    Extremities: No cyanosis, clubbing trace pretibial edema   Skin: no xanthelasma, warm.    Neurologic: normal arm movement bilateral, no tremors     Psychiatric: Appropriate affect.      Encounter Vitals  BP: 124/71  Pulse: 58  Resp: 16  Weight: 89.4 kg (197 lb) (With shoes.)  Height: 171.5 cm (5' 7.5\")                                           Medical History  Surgical History Family " History Social History   No past medical history on file. No past surgical history on file. No family history on file. Social History     Socioeconomic History    Marital status:      Spouse name: Not on file    Number of children: Not on file    Years of education: Not on file    Highest education level: Not on file   Occupational History    Not on file   Tobacco Use    Smoking status: Former     Current packs/day: 0.00     Types: Cigarettes     Quit date:      Years since quittin.3    Smokeless tobacco: Never   Vaping Use    Vaping status: Never Used   Substance and Sexual Activity    Alcohol use: Not on file    Drug use: Never    Sexual activity: Not on file   Other Topics Concern    Not on file   Social History Narrative    Not on file     Social Drivers of Health     Financial Resource Strain: Not on file   Food Insecurity: No Food Insecurity (10/31/2024)    Received from [facility]    Hunger Vital Sign     Worried About Running Out of Food in the Last Year: Never true     Ran Out of Food in the Last Year: Never true   Transportation Needs: No Transportation Needs (10/31/2024)    Received from [facility]    PRAPARE - Transportation     Lack of Transportation (Medical): No     Lack of Transportation (Non-Medical): No   Physical Activity: Sufficiently Active (10/31/2024)    Received from [facility]    Exercise Vital Sign     Days of Exercise per Week: 3 days     Minutes of Exercise per Session: 60 min   Stress: Not on file   Social Connections: Not on file   Interpersonal Safety: Not on file   Housing Stability: Low Risk  (10/31/2024)    Received from [facility]    Housing Stability     What is your living situation today?: I have a steady place to live          Medications  Allergies   Current Outpatient Medications   Medication Sig Dispense Refill    acetaminophen (TYLENOL) 500 MG tablet Take 1,000 mg by mouth as needed      albuterol (PROAIR HFA/PROVENTIL HFA/VENTOLIN HFA) 108 (90 Base)  "MCG/ACT inhaler Inhale 1-2 puffs into the lungs as needed      amLODIPine (NORVASC) 5 MG tablet Take 1 tablet (5 mg) by mouth daily. 90 tablet 0    ascorbic acid 1000 MG TABS tablet Take 1 tablet by mouth daily      fluticasone-salmeterol (ADVAIR) 250-50 MCG/ACT inhaler Inhale 1 puff into the lungs daily      gabapentin (NEURONTIN) 600 MG tablet Take 900 mg by mouth 2 times daily      glucosamine-chondroitin 500-400 MG CAPS per capsule Take 1 capsule by mouth 2 times daily      lactobacillus rhamnosus, GG, (CULTURELL) capsule Take 1 capsule by mouth 2 times daily      latanoprost (XALATAN) 0.005 % ophthalmic solution Place 1 drop into both eyes daily      losartan (COZAAR) 100 MG tablet Take 1 tablet (100 mg) by mouth daily. 90 tablet 0    metoprolol succinate ER (TOPROL XL) 50 MG 24 hr tablet Take 1 tablet (50 mg) by mouth daily. 90 tablet 0    naproxen sodium 220 MG capsule Take 220 mg by mouth as needed.      Omega-3 Fatty Acids (FISH OIL DOUBLE STRENGTH) 1200 MG CAPS Take 1 capsule by mouth 2 times daily      omeprazole (PRILOSEC) 20 MG DR capsule Take 20 mg by mouth daily      rosuvastatin (CRESTOR) 10 MG tablet Take 1 tablet (10 mg) by mouth daily. 90 tablet 0    sildenafil (VIAGRA) 100 MG tablet Take 100 mg by mouth daily as needed      tadalafil (CIALIS) 5 MG tablet Take 5 mg by mouth daily as needed      Allergies   Allergen Reactions    Azithromycin Hives    Rabies Vaccine (Hamster) Hives     Says human rabies immune globulin-no reactions noted    Sulfa Antibiotics Hives    Atorvastatin Muscle Pain (Myalgia)    Rabies Immune Globulin (Human) Angioedema, Hives and Other (See Comments)    Silicone Rash         Lab Results    Chemistry/lipid CBC Cardiac Enzymes/BNP/TSH/INR   Lab Results   Component Value Date    TRIG 72 03/15/2022    No results found for: \"WBC\", \"HGB\", \"HCT\", \"MCV\", \"PLT\" No results found for: \"CKTOTAL\", \"CKMB\", \"TROPONINI\", \"BNP\", \"TSH\", \"INR\"                                         "